# Patient Record
Sex: MALE | Race: WHITE | HISPANIC OR LATINO | Employment: UNEMPLOYED | URBAN - METROPOLITAN AREA
[De-identification: names, ages, dates, MRNs, and addresses within clinical notes are randomized per-mention and may not be internally consistent; named-entity substitution may affect disease eponyms.]

---

## 2020-02-09 ENCOUNTER — APPOINTMENT (EMERGENCY)
Dept: CT IMAGING | Facility: HOSPITAL | Age: 80
End: 2020-02-09
Payer: MEDICARE

## 2020-02-09 ENCOUNTER — HOSPITAL ENCOUNTER (EMERGENCY)
Facility: HOSPITAL | Age: 80
Discharge: HOME | End: 2020-02-09
Attending: EMERGENCY MEDICINE
Payer: MEDICARE

## 2020-02-09 VITALS
RESPIRATION RATE: 18 BRPM | HEIGHT: 71 IN | SYSTOLIC BLOOD PRESSURE: 185 MMHG | OXYGEN SATURATION: 98 % | HEART RATE: 78 BPM | WEIGHT: 205.47 LBS | TEMPERATURE: 98.1 F | BODY MASS INDEX: 28.77 KG/M2 | DIASTOLIC BLOOD PRESSURE: 96 MMHG

## 2020-02-09 DIAGNOSIS — R33.8 ACUTE URINARY RETENTION: Primary | ICD-10-CM

## 2020-02-09 LAB
BACTERIA UR QL AUTO: ABNORMAL /HPF
BILIRUB UR QL STRIP: NEGATIVE
CLARITY UR: CLEAR
COLOR UR: ABNORMAL
GLUCOSE UR STRIP-MCNC: NEGATIVE MG/DL
HGB UR QL STRIP.AUTO: 250
KETONES UR STRIP-MCNC: NEGATIVE MG/DL
LEUKOCYTE ESTERASE UR QL STRIP: NEGATIVE
NITRITE UR QL STRIP: NEGATIVE
NON-SQ EPI CELLS URNS QL MICRO: ABNORMAL /HPF
PH UR STRIP.AUTO: 6 [PH]
PROT UR STRIP-MCNC: NEGATIVE MG/DL
RBC #/AREA URNS AUTO: ABNORMAL /HPF
SP GR UR STRIP.AUTO: 1.01 (ref 1–1.04)
UROBILINOGEN UA: NEGATIVE MG/DL
WBC #/AREA URNS AUTO: ABNORMAL /HPF

## 2020-02-09 PROCEDURE — 74176 CT ABD & PELVIS W/O CONTRAST: CPT

## 2020-02-09 PROCEDURE — 99284 EMERGENCY DEPT VISIT MOD MDM: CPT

## 2020-02-09 PROCEDURE — 81001 URINALYSIS AUTO W/SCOPE: CPT | Performed by: EMERGENCY MEDICINE

## 2020-02-09 PROCEDURE — 99281 EMR DPT VST MAYX REQ PHY/QHP: CPT | Performed by: EMERGENCY MEDICINE

## 2020-02-09 NOTE — ED PROVIDER NOTES
History  Chief Complaint   Patient presents with    Urinary Retention     EMS stated that pt just came here from the DR yesterday  pt states that he has not urinated since early yesterday  pt states he is having a lot of abd/pelvic pain at this time  77 y/o male BBA for c/o difficulty urinating for one days duration; last urinated yesterday per patient  States just entered US yesterday from   No prior history of BPH or urinary retention  Denies any fever/chills; no prior history of nephrolithiasis  Denies any nausea, vomiting, and/or flank pain  No history of spinal trauma  None       Past Medical History:   Diagnosis Date    Kidney stones        Past Surgical History:   Procedure Laterality Date    LAPAROSCOPIC GASTRIC BYPASS         History reviewed  No pertinent family history  I have reviewed and agree with the history as documented  Social History     Tobacco Use    Smoking status: Never Smoker    Smokeless tobacco: Never Used   Substance Use Topics    Alcohol use: Yes    Drug use: Never        Review of Systems   Genitourinary: Positive for decreased urine volume and difficulty urinating  All other systems reviewed and are negative  Physical Exam  Physical Exam   Constitutional: He is oriented to person, place, and time  He appears well-developed and well-nourished  HENT:   Head: Normocephalic and atraumatic  Eyes: Pupils are equal, round, and reactive to light  Conjunctivae and EOM are normal    Neck: Normal range of motion  Neck supple  Cardiovascular: Normal rate  Pulmonary/Chest: Effort normal and breath sounds normal    Abdominal: Soft  Bowel sounds are normal    Genitourinary: Penis normal    Musculoskeletal: Normal range of motion  Neurological: He is alert and oriented to person, place, and time  Skin: Skin is warm  Capillary refill takes less than 2 seconds  Psychiatric: He has a normal mood and affect     Nursing note and vitals reviewed  Vital Signs  ED Triage Vitals [02/09/20 0525]   Temperature Pulse Respirations Blood Pressure SpO2   98 1 °F (36 7 °C) 78 18 (!) 185/96 98 %      Temp Source Heart Rate Source Patient Position - Orthostatic VS BP Location FiO2 (%)   Tympanic Monitor Standing Left arm --      Pain Score       --           Vitals:    02/09/20 0525   BP: (!) 185/96   Pulse: 78   Patient Position - Orthostatic VS: Standing         Visual Acuity      ED Medications  Medications - No data to display    Diagnostic Studies  Results Reviewed     Procedure Component Value Units Date/Time    Urine Microscopic [[id number]]  (Abnormal) Collected:  02/09/20 0547    Lab Status:  Final result Specimen:  Urine, Indwelling Dobbs Catheter Updated:  02/09/20 0631     RBC, UA 10-20 /hpf      WBC, UA None Seen /hpf      Epithelial Cells Occasional /hpf      Bacteria, UA Occasional /hpf     UA (URINE) with reflex to Scope [[id number]]  (Abnormal) Collected:  02/09/20 0547    Lab Status:  Final result Specimen:  Urine, Indwelling Dobbs Catheter Updated:  02/09/20 0600     Color, UA Straw     Clarity, UA Clear     Specific Gravity, UA 1 010     pH, UA 6 0     Leukocytes, UA Negative     Nitrite, UA Negative     Protein, UA Negative mg/dl      Glucose, UA Negative mg/dl      Ketones, UA Negative mg/dl      Bilirubin, UA Negative     Blood,  0     UROBILINOGEN UA Negative mg/dL                  CT renal stone study abdomen pelvis without contrast   Final Result by Tanner Wilson MD (02/09 0630)      No evidence of stone or hydronephrosis  Prostatomegaly  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                     Workstation performed: [id number]                    Procedures  Procedures         ED Course  ED Course as of Feb 09 0646   Saskia Gao Feb 09, 2020   6091 Bladder scan: 561 cc                                  MDM      Disposition  Final diagnoses:   Acute urinary retention     Time reflects when diagnosis was documented in both MDM as applicable and the Disposition within this note     Time User Action Codes Description Comment    2/9/2020  6:45 AM Evelene Martín [R33 9] Urinary retention     2/9/2020  6:45 AM Han Emily Add [R33 8] Acute urinary retention     2/9/2020  6:45 AM Han Emily Modify [R33 8] Acute urinary retention     2/9/2020  6:45 AM Stone Hush [R33 9] Urinary retention       ED Disposition     ED Disposition Condition Date/Time Comment    Discharge Stable Sun Feb 9, 2020  6:45 AM Roney Handy discharge to home/self care  Follow-up Information     Follow up With Specialties Details Why Contact Info    [facility] Call today  [address]  [phone]            Patient's Medications    No medications on file     No discharge procedures on file      ED Provider  Electronically Signed by           Thor Oakes DO  02/09/20 3745

## 2020-02-09 NOTE — ED NOTES
This RN placed a leg bag on patients gates catheter and given instructions to follow up with urology asap  Patient verbalized an understanding       Liam Leo RN  02/09/20 3608

## 2020-02-10 ENCOUNTER — HOSPITAL ENCOUNTER (EMERGENCY)
Facility: HOSPITAL | Age: 80
Discharge: HOME | End: 2020-02-10
Attending: EMERGENCY MEDICINE
Payer: MEDICARE

## 2020-02-10 ENCOUNTER — TELEPHONE (OUTPATIENT)
Dept: UROLOGY | Facility: MEDICAL CENTER | Age: 80
End: 2020-02-10

## 2020-02-10 VITALS
SYSTOLIC BLOOD PRESSURE: 168 MMHG | DIASTOLIC BLOOD PRESSURE: 88 MMHG | RESPIRATION RATE: 18 BRPM | HEART RATE: 80 BPM | OXYGEN SATURATION: 98 % | BODY MASS INDEX: 28.66 KG/M2 | TEMPERATURE: 99 F | WEIGHT: 205.47 LBS

## 2020-02-10 DIAGNOSIS — T83.9XXA PROBLEM WITH FOLEY CATHETER, INITIAL ENCOUNTER (HCC): Primary | ICD-10-CM

## 2020-02-10 PROCEDURE — 99281 EMR DPT VST MAYX REQ PHY/QHP: CPT | Performed by: PHYSICIAN ASSISTANT

## 2020-02-10 PROCEDURE — 99283 EMERGENCY DEPT VISIT LOW MDM: CPT

## 2020-02-10 NOTE — TELEPHONE ENCOUNTER
Complaint/diagnosis:  New pt er follow up urinary retention   Insurance:  medicare  History of Cancer:  no  Previous Urologist:  No  Outside testing/where:  no  what kind of test:  no  Records requested/where:  No  Preferred Location  [facility]

## 2020-02-10 NOTE — TELEPHONE ENCOUNTER
Spoke with the patient; Patient needs hospital follow-up for urinary retention  We've scheduled him for a new patient appointment on 02/12/2020 with Mike Rdz  Patient requested that I speak with his insurance in regards to getting transportation set up  I've agreed to help him and will call once the ride has been set up

## 2020-02-10 NOTE — TELEPHONE ENCOUNTER
Left message; I explained that he needs to call his insurance company and update his information in order for them to help him set up his transportation  Gave our office number to call back with any questions

## 2020-02-10 NOTE — TELEPHONE ENCOUNTER
This is a new patient that is being discharged from [facility]  Patient has urinary retention and has a catheter  Please call patient back at [phone]

## 2020-02-10 NOTE — ED PROVIDER NOTES
History  Chief Complaint   Patient presents with    Urinary Catheter Problem     75-year-old male with past medical history of kidney stone presenting for evaluation of Dobbs catheter issue  Patient was seen in this ED yesterday for urinary retention and had a Dobbs catheter placed  Patient was given information to follow up with Urology however he attempted to call the Urology number provided but it was incorrect  Instead of trying to call the ED he called EMS to bring him back to the hospital   He does state that he is having leaking at the meatus but states that is only a few drops  He reports urinary output through the tube into the bag  Denies any hematuria  None       Past Medical History:   Diagnosis Date    Kidney stones        Past Surgical History:   Procedure Laterality Date    LAPAROSCOPIC GASTRIC BYPASS         History reviewed  No pertinent family history  I have reviewed and agree with the history as documented  Social History     Tobacco Use    Smoking status: Never Smoker    Smokeless tobacco: Never Used   Substance Use Topics    Alcohol use: Yes    Drug use: Never        Review of Systems   All other systems reviewed and are negative  Physical Exam  Physical Exam   Constitutional: He is oriented to person, place, and time  He appears well-developed and well-nourished  No distress  HENT:   Head: Normocephalic and atraumatic  Right Ear: External ear normal    Left Ear: External ear normal    Eyes: Conjunctivae are normal    EOM grossly intact   Neck: Normal range of motion  Neck supple  No JVD present  Cardiovascular: Normal rate  Pulmonary/Chest: Effort normal  No stridor  No respiratory distress  He has no wheezes  Abdominal: Soft  He exhibits no distension  There is no tenderness  Musculoskeletal:   FROM, steady gait, cap refill brisk, strength and sensation grossly intact throughout   Lymphadenopathy:     He has no cervical adenopathy     Neurological: He is alert and oriented to person, place, and time  Skin: Skin is warm and dry  Capillary refill takes less than 2 seconds  Psychiatric: He has a normal mood and affect  His behavior is normal    Nursing note and vitals reviewed  Vital Signs  ED Triage Vitals [02/10/20 1251]   Temperature Pulse Respirations Blood Pressure SpO2   99 °F (37 2 °C) 80 18 168/88 98 %      Temp Source Heart Rate Source Patient Position - Orthostatic VS BP Location FiO2 (%)   Tympanic Monitor Lying Left arm --      Pain Score       7           Vitals:    02/10/20 1251   BP: 168/88   Pulse: 80   Patient Position - Orthostatic VS: Lying         Visual Acuity      ED Medications  Medications - No data to display    Diagnostic Studies  Results Reviewed     None                 No orders to display              Procedures  Procedures         ED Course                               MDM  Number of Diagnoses or Management Options  Problem with Gates catheter, initial encounter [facility]):   Diagnosis management comments: 70-year-old male presenting for evaluation of slight leaking surrounding his Gates catheter that was placed yesterday secondary to urinary retention, patient was given that information to follow up with Urology so he presented back to the ED, patient is in no pain currently, will provide him with gauze but no copious amt of urine leaking, no need to change gates at this time as it was placed <24 hours ago, advised to f/u with urology and pt given correct information    strict return to ED precautions discussed  Pt verbalizes understanding and agrees with plan  Pt is stable for discharge    Portions of the record may have been created with voice recognition software  Occasional wrong word or "sound a like" substitutions may have occurred due to the inherent limitations of voice recognition software  Read the chart carefully and recognize, using context, where substitutions have occurred            Disposition  Final diagnoses: Problem with Dobbs catheter, initial encounter [facility])     Time reflects when diagnosis was documented in both MDM as applicable and the Disposition within this note     Time User Action Codes Description Comment    2/10/2020  1:29 PM Peter Chaparro Add [T83  9XXA] Problem with Dobbs catheter, initial encounter [facility])       ED Disposition     ED Disposition Condition Date/Time Comment    Discharge Stable Mon Feb 10, 2020  1:29 PM Christie Roy discharge to home/self care  Follow-up Information     Follow up With Specialties Details Why Contact Info Additional [address] Family Medicine Call in 1 day  [address], [address] [facility] [phone]  [address], [address], [address] [place], [place], [address]    [facility] Call in 1 day  [place] [address]  [address]  [facility], [address], [facility] [place], [address]   [phone]          There are no discharge medications for this patient  No discharge procedures on file      ED Provider  Electronically Signed by           Serina Martínez PA-C  02/10/20 4895

## 2020-02-10 NOTE — TELEPHONE ENCOUNTER
Spoke with Rizwana Sood with Medicare transportation; As we reviewed the patient's information, Rizwana Sood pointed out that all their information on the patient is currently outdated - they don't have an updated phone number and they have his current address listed as being in [place]  He needs the patient to call them and update his information before they can schedule his ride  Will call the patient and let him know

## 2020-02-11 PROBLEM — R33.9 URINARY RETENTION: Status: ACTIVE | Noted: 2020-02-11

## 2020-02-12 ENCOUNTER — HOSPITAL ENCOUNTER (EMERGENCY)
Facility: HOSPITAL | Age: 80
Discharge: HOME | End: 2020-02-12
Attending: EMERGENCY MEDICINE
Payer: MEDICARE

## 2020-02-12 ENCOUNTER — TELEPHONE (OUTPATIENT)
Dept: UROLOGY | Facility: MEDICAL CENTER | Age: 80
End: 2020-02-12

## 2020-02-12 VITALS
HEART RATE: 75 BPM | TEMPERATURE: 96.8 F | WEIGHT: 201.94 LBS | OXYGEN SATURATION: 96 % | SYSTOLIC BLOOD PRESSURE: 168 MMHG | DIASTOLIC BLOOD PRESSURE: 84 MMHG | BODY MASS INDEX: 28.17 KG/M2 | RESPIRATION RATE: 18 BRPM

## 2020-02-12 DIAGNOSIS — N39.0 UTI (URINARY TRACT INFECTION) DUE TO URINARY INDWELLING FOLEY CATHETER (HCC): Primary | ICD-10-CM

## 2020-02-12 DIAGNOSIS — T83.511A UTI (URINARY TRACT INFECTION) DUE TO URINARY INDWELLING FOLEY CATHETER (HCC): Primary | ICD-10-CM

## 2020-02-12 LAB
BACTERIA UR QL AUTO: ABNORMAL /HPF
BILIRUB UR QL STRIP: NEGATIVE
CAOX CRY URNS QL MICRO: ABNORMAL /HPF
CLARITY UR: ABNORMAL
COLOR UR: ABNORMAL
GLUCOSE UR STRIP-MCNC: NEGATIVE MG/DL
HGB UR QL STRIP.AUTO: 250
HYALINE CASTS #/AREA URNS LPF: ABNORMAL /LPF
KETONES UR STRIP-MCNC: ABNORMAL MG/DL
LEUKOCYTE ESTERASE UR QL STRIP: 100
MUCOUS THREADS UR QL AUTO: ABNORMAL
NITRITE UR QL STRIP: POSITIVE
NON-SQ EPI CELLS URNS QL MICRO: ABNORMAL /HPF
PH UR STRIP.AUTO: 6 [PH]
PROT UR STRIP-MCNC: ABNORMAL MG/DL
RBC #/AREA URNS AUTO: ABNORMAL /HPF
SP GR UR STRIP.AUTO: 1.02 (ref 1–1.04)
UROBILINOGEN UA: NEGATIVE MG/DL
WBC #/AREA URNS AUTO: ABNORMAL /HPF

## 2020-02-12 PROCEDURE — 81001 URINALYSIS AUTO W/SCOPE: CPT | Performed by: PHYSICIAN ASSISTANT

## 2020-02-12 PROCEDURE — 81003 URINALYSIS AUTO W/O SCOPE: CPT | Performed by: PHYSICIAN ASSISTANT

## 2020-02-12 PROCEDURE — 87086 URINE CULTURE/COLONY COUNT: CPT | Performed by: PHYSICIAN ASSISTANT

## 2020-02-12 PROCEDURE — 99283 EMERGENCY DEPT VISIT LOW MDM: CPT

## 2020-02-12 PROCEDURE — 99284 EMERGENCY DEPT VISIT MOD MDM: CPT | Performed by: PHYSICIAN ASSISTANT

## 2020-02-12 RX ORDER — SULFAMETHOXAZOLE AND TRIMETHOPRIM 800; 160 MG/1; MG/1
1 TABLET ORAL 2 TIMES DAILY
Qty: 14 TABLET | Refills: 0 | Status: SHIPPED | OUTPATIENT
Start: 2020-02-12 | End: 2020-02-19

## 2020-02-12 NOTE — TELEPHONE ENCOUNTER
Patient called regarding transportation  I advised the patient that he needs to update his information with his insurance  Once he does yessenia advised we can give his an early morning new patient appointment with one of the AP

## 2020-02-12 NOTE — TELEPHONE ENCOUNTER
Spoke with the patient; I've called him to see if he got a hold of his insurance company and update his information as we had previously discussed  Patient reports that he hasn't, he's actually at the hospital in hopes that they can remove his Dobbs there  We'll leave this decision up to the doctor he's seeing today  He's free to call us if there's anything else we can do for him

## 2020-02-12 NOTE — TELEPHONE ENCOUNTER
Call Center spoke with pt who denies getting the message about calling his insurance company  He has not done this, and they are asking him to do it now  Call Center rescheduling appt to when he can get a ride to the office, unless he can get a ride today  Today's appt not rescheduled as of this time

## 2020-02-12 NOTE — TELEPHONE ENCOUNTER
Patient calling to find out about his transportation  I explained to patient we do not have transportation set up for him as he is a new patient  Patient stated his insurance set up  Advised patient he should call insurance as we have no knowledge of this  Advised patient if he has transportation issues to call back so we can reschedule for another time

## 2020-02-12 NOTE — TELEPHONE ENCOUNTER
Patient calling again  Looking for us to set up transportation for him to an appointment  He needs to reschedule his ER follow up appointment       He can be reached at [phone]

## 2020-02-12 NOTE — ED PROVIDER NOTES
History  Chief Complaint   Patient presents with    Urinary Catheter Problem     Patient is a 51-year-old male with a past medical history significant for kidney stones who presents today for evaluation of a Dobbs catheter issue and hematuria in the Dobbs bag  Patient was seen in this emergency department within the past week for urinary retention for which he had a Dobbs catheter placed and was recommended to follow up with Urology however reports he was unable to do so due to lack of transportation  Patient reports he has had hematuria noted in his Dobbs catheter bag beginning this morning and endorses some irritation at the urinary meatus however denies any penile discharge or erythema or swelling of the penis  Patient reports he is uncircumcised and has not had any difficulty retracting the foreskin  Patient denies any abdominal pain, fevers or chills associated with his symptoms  History provided by:  Patient   used: No    Urinary Catheter Problem   Location:  Urinary catheter  Associated symptoms: no abdominal pain, no chest pain, no congestion, no ear pain, no fatigue, no fever, no nausea, no rash, no rhinorrhea, no shortness of breath, no sore throat and no vomiting        None       Past Medical History:   Diagnosis Date    Kidney stones        Past Surgical History:   Procedure Laterality Date    LAPAROSCOPIC GASTRIC BYPASS         History reviewed  No pertinent family history  I have reviewed and agree with the history as documented  Social History     Tobacco Use    Smoking status: Never Smoker    Smokeless tobacco: Never Used   Substance Use Topics    Alcohol use: Yes    Drug use: Never       Review of Systems   Constitutional: Negative for chills, fatigue and fever  HENT: Negative for congestion, ear pain, rhinorrhea and sore throat  Eyes: Negative for redness  Respiratory: Negative for chest tightness and shortness of breath      Cardiovascular: Negative for chest pain and palpitations  Gastrointestinal: Negative for abdominal pain, nausea and vomiting  Genitourinary: Positive for hematuria  Negative for dysuria  Musculoskeletal: Negative  Skin: Negative for rash  Neurological: Negative for dizziness, syncope, light-headedness and numbness  Physical Exam  Physical Exam   Constitutional: He is oriented to person, place, and time  He appears well-developed and well-nourished  HENT:   Head: Normocephalic and atraumatic  Eyes: Pupils are equal, round, and reactive to light  Neck: Normal range of motion  Cardiovascular: Normal rate, regular rhythm and normal heart sounds  Pulmonary/Chest: Effort normal and breath sounds normal    Abdominal: Soft  There is no tenderness  There is no guarding  Genitourinary:         Lymphadenopathy:     He has no cervical adenopathy  Neurological: He is alert and oriented to person, place, and time  Skin: Skin is warm and dry  Capillary refill takes less than 2 seconds  Psychiatric: He has a normal mood and affect  Nursing note and vitals reviewed        Vital Signs  ED Triage Vitals [02/12/20 1339]   Temperature Pulse Respirations Blood Pressure SpO2   (!) 96 8 °F (36 °C) 75 18 168/84 96 %      Temp Source Heart Rate Source Patient Position - Orthostatic VS BP Location FiO2 (%)   Tympanic Monitor Sitting Left arm --      Pain Score       8           Vitals:    02/12/20 1339   BP: 168/84   Pulse: 75   Patient Position - Orthostatic VS: Sitting         Visual Acuity      ED Medications  Medications - No data to display    Diagnostic Studies  Results Reviewed     Procedure Component Value Units Date/Time    Urine Microscopic [[id number]]  (Abnormal) Collected:  02/12/20 1400    Lab Status:  Final result Specimen:  Urine, Indwelling Dobbs Catheter Updated:  02/12/20 1504     RBC, UA 30-50 /hpf      WBC, UA 30-50 /hpf      Epithelial Cells Occasional /hpf      Bacteria, UA Innumerable /hpf Hyaline Casts, UA 0-1 /lpf      Ca Oxalate Indigo, UA Occasional /hpf      MUCUS THREADS Innumerable    Urine culture [[id number]] Collected:  02/12/20 1400    Lab Status: In process Specimen:  Urine, Indwelling Dobbs Catheter Updated:  02/12/20 1504    UA w Reflex to Microscopic w Reflex to Culture [[id number]]  (Abnormal) Collected:  02/12/20 1400    Lab Status:  Final result Specimen:  Urine, Indwelling Dobbs Catheter Updated:  02/12/20 1455     Color, UA Kaya     Clarity, UA Cloudy     Specific [place], UA 1 020     pH, UA 6 0     Leukocytes,  0     Nitrite, UA Positive     Protein,  (2+) mg/dl      Glucose, UA Negative mg/dl      Ketones, UA 5 (Trace) mg/dl      Bilirubin, UA Negative     Blood,  0     UROBILINOGEN UA Negative mg/dL                  No orders to display              Procedures  Procedures         ED Course                               MDM      Disposition  Final diagnoses:   UTI (urinary tract infection) due to urinary indwelling Dobbs catheter ([address] )     Time reflects when diagnosis was documented in both MDM as applicable and the Disposition within this note     Time User Action Codes Description Comment    2/12/2020  3:02 PM Peter Monique Add [T83 511A,  N39 0] UTI (urinary tract infection) due to urinary indwelling Dobbs catheter [facility])       ED Disposition     ED Disposition Condition Date/Time Comment    Discharge Good Wed Feb 12, 2020  2:59 PM Anaid Roane discharge to home/self care              Follow-up Information     Follow up With Specialties Details Why Contact Info    Tami Ramirez MD Urology Schedule an appointment as soon as possible for a visit in 1 day  [address] [phone]            Discharge Medication List as of 2/12/2020  3:02 PM      START taking these medications    Details   sulfamethoxazole-trimethoprim (BACTRIM DS) 800-160 mg per tablet Take 1 tablet by mouth 2 (two) times a day for 7 days smx-tmp DS (BACTRIM) 800-160 mg tabs (1tab q12 D10), Starting Wed 2/12/2020, Until Wed 2/19/2020, Print           No discharge procedures on file      PDMP Review     None          ED Provider  Electronically Signed by           Claudia Verdugo PA-C  02/12/20 8367

## 2020-02-13 LAB — BACTERIA UR CULT: NORMAL

## 2020-02-13 NOTE — TELEPHONE ENCOUNTER
Please Triage - ER FU    Patient calling to schedule an ER visit  Se[facility] [address] [facility] on 2/12 for UTI  Dilia was not removed at ER     Willing to be seen at [facility] or [place]      He can be reached at [phone]

## 2020-02-14 ENCOUNTER — OFFICE VISIT (OUTPATIENT)
Dept: UROLOGY | Facility: MEDICAL CENTER | Age: 80
End: 2020-02-14
Payer: MEDICARE

## 2020-02-14 VITALS
DIASTOLIC BLOOD PRESSURE: 68 MMHG | WEIGHT: 201 LBS | SYSTOLIC BLOOD PRESSURE: 110 MMHG | BODY MASS INDEX: 28.14 KG/M2 | HEART RATE: 97 BPM | HEIGHT: 71 IN

## 2020-02-14 DIAGNOSIS — N30.00 ACUTE CYSTITIS WITHOUT HEMATURIA: ICD-10-CM

## 2020-02-14 DIAGNOSIS — R33.9 URINARY RETENTION: ICD-10-CM

## 2020-02-14 DIAGNOSIS — N40.1 BPH WITH URINARY OBSTRUCTION: Primary | ICD-10-CM

## 2020-02-14 DIAGNOSIS — N13.8 BPH WITH URINARY OBSTRUCTION: Primary | ICD-10-CM

## 2020-02-14 PROCEDURE — 99204 OFFICE O/P NEW MOD 45 MIN: CPT | Performed by: UROLOGY

## 2020-02-14 RX ORDER — TAMSULOSIN HYDROCHLORIDE 0.4 MG/1
0.4 CAPSULE ORAL DAILY
Qty: 30 CAPSULE | Refills: 3 | Status: SHIPPED | OUTPATIENT
Start: 2020-02-14

## 2020-02-14 NOTE — PROGRESS NOTES
Assessment/Plan:      Diagnoses and all orders for this visit:    BPH with urinary obstruction  -     tamsulosin (FLOMAX) 0 4 mg; Take 1 capsule (0 4 mg total) by mouth daily    Urinary retention  -     POCT Measure PVR; Future    Acute cystitis without hematuria        Plan:  He will complete the antibiotic course and will give him a voiding trial and start him on alpha blockers  He will follow up in a few weeks for reassessment and PVR  Subjective:  Urinary retention     Patient ID: Isabel Olvera is a 78 y o  male  HPI  Earlier this week the patient, the patient experienced symptoms of a UTI and difficulty voiding  He was found to be in urinary retention upon a visit to the emergency room, and a Dobbs catheter was placed in left in place  He was started and is continuing on antibiotics (Bactrim)  He denies any prior history of UTIs or voiding dysfunction  He was told he had an enlarged prostate and may have had some symptoms occasionally related to that  Review of Systems   Constitutional: Negative  HENT: Negative  Eyes: Negative  Respiratory: Negative  Cardiovascular: Negative  Gastrointestinal: Positive for abdominal pain  Endocrine: Negative  Genitourinary: Positive for difficulty urinating and dysuria  Musculoskeletal: Negative  Skin: Negative  Allergic/Immunologic: Negative  Neurological: Negative  Hematological: Negative  Psychiatric/Behavioral: Negative  Objective:     Physical Exam   Constitutional: He is oriented to person, place, and time  He appears well-developed and well-nourished  No distress  HENT:   Head: Normocephalic and atraumatic  Nose: Nose normal    Mouth/Throat: Oropharynx is clear and moist    Eyes: Pupils are equal, round, and reactive to light  Conjunctivae and EOM are normal  No scleral icterus  Neck: Normal range of motion  Neck supple     Cardiovascular: Normal rate, regular rhythm, normal heart sounds and intact distal pulses  No murmur heard  Pulmonary/Chest: Effort normal and breath sounds normal  No respiratory distress  He has no wheezes  He has no rales  Abdominal: Soft  Bowel sounds are normal  He exhibits no distension and no mass  There is no tenderness  Genitourinary:   Genitourinary Comments: Dobbs catheter in place with juliocesar urine returned  Prostate exam:  3/4 enlargement, smooth, with no nodules induration   Musculoskeletal: Normal range of motion  He exhibits no edema or tenderness  Lymphadenopathy:     He has no cervical adenopathy  Neurological: He is alert and oriented to person, place, and time  No cranial nerve deficit  Skin: Skin is warm and dry  No rash noted  No erythema  No pallor  Psychiatric: He has a normal mood and affect  His behavior is normal  Judgment and thought content normal    Nursing note and vitals reviewed  Dobbs catheter removed today    CT scan the abdomen and pelvis from 02/09/2020  IMPRESSION:  No evidence of stone or hydronephrosis  Prostatomegaly  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

## 2025-03-04 ENCOUNTER — OFFICE VISIT (OUTPATIENT)
Dept: FAMILY MEDICINE CLINIC | Facility: CLINIC | Age: 85
End: 2025-03-04

## 2025-03-04 VITALS
WEIGHT: 204.1 LBS | DIASTOLIC BLOOD PRESSURE: 84 MMHG | SYSTOLIC BLOOD PRESSURE: 168 MMHG | HEART RATE: 71 BPM | TEMPERATURE: 98.1 F | RESPIRATION RATE: 18 BRPM | OXYGEN SATURATION: 99 % | HEIGHT: 67 IN | BODY MASS INDEX: 32.03 KG/M2

## 2025-03-04 DIAGNOSIS — Z23 ENCOUNTER FOR IMMUNIZATION: ICD-10-CM

## 2025-03-04 DIAGNOSIS — R39.12 BENIGN PROSTATIC HYPERPLASIA WITH WEAK URINARY STREAM: ICD-10-CM

## 2025-03-04 DIAGNOSIS — R53.83 OTHER FATIGUE: ICD-10-CM

## 2025-03-04 DIAGNOSIS — R41.3 IMPAIRED MEMORY: ICD-10-CM

## 2025-03-04 DIAGNOSIS — J44.89 COPD WITH CHRONIC BRONCHITIS (HCC): Primary | ICD-10-CM

## 2025-03-04 DIAGNOSIS — R03.0 ELEVATED BLOOD PRESSURE READING WITHOUT DIAGNOSIS OF HYPERTENSION: ICD-10-CM

## 2025-03-04 DIAGNOSIS — R29.810 FACIAL DROOP: ICD-10-CM

## 2025-03-04 DIAGNOSIS — Z13.220 SCREENING FOR LIPID DISORDERS: ICD-10-CM

## 2025-03-04 DIAGNOSIS — N40.1 BENIGN PROSTATIC HYPERPLASIA WITH WEAK URINARY STREAM: ICD-10-CM

## 2025-03-04 PROBLEM — J43.8 OTHER EMPHYSEMA (HCC): Status: ACTIVE | Noted: 2025-03-04

## 2025-03-04 PROBLEM — N13.8 BPH WITH URINARY OBSTRUCTION: Status: ACTIVE | Noted: 2020-04-20

## 2025-03-04 PROBLEM — R33.9 URINARY RETENTION: Status: RESOLVED | Noted: 2020-02-11 | Resolved: 2025-03-04

## 2025-03-04 PROCEDURE — G0008 ADMIN INFLUENZA VIRUS VAC: HCPCS | Performed by: FAMILY MEDICINE

## 2025-03-04 PROCEDURE — G0009 ADMIN PNEUMOCOCCAL VACCINE: HCPCS | Performed by: FAMILY MEDICINE

## 2025-03-04 PROCEDURE — 99204 OFFICE O/P NEW MOD 45 MIN: CPT | Performed by: FAMILY MEDICINE

## 2025-03-04 PROCEDURE — 90677 PCV20 VACCINE IM: CPT | Performed by: FAMILY MEDICINE

## 2025-03-04 PROCEDURE — 90662 IIV NO PRSV INCREASED AG IM: CPT | Performed by: FAMILY MEDICINE

## 2025-03-04 RX ORDER — ALBUTEROL SULFATE 90 UG/1
2 INHALANT RESPIRATORY (INHALATION) EVERY 6 HOURS PRN
Qty: 18 G | Refills: 5 | Status: SHIPPED | OUTPATIENT
Start: 2025-03-04

## 2025-03-04 RX ORDER — FLUTICASONE PROPIONATE AND SALMETEROL 100; 50 UG/1; UG/1
1 POWDER RESPIRATORY (INHALATION) 2 TIMES DAILY
Qty: 60 BLISTER | Refills: 1 | Status: SHIPPED | OUTPATIENT
Start: 2025-03-04

## 2025-03-04 RX ORDER — TAMSULOSIN HYDROCHLORIDE 0.4 MG/1
0.4 CAPSULE ORAL DAILY
Qty: 30 CAPSULE | Refills: 3 | Status: SHIPPED | OUTPATIENT
Start: 2025-03-04

## 2025-03-04 NOTE — ASSESSMENT & PLAN NOTE
Chronic left-sided facial droop  Hx of mastoiditis requiring surgical intervention.  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

## 2025-03-04 NOTE — PROGRESS NOTES
Name: Omar Ace      : 1940      MRN: [id number]  Encounter Provider: Nicholas Austin MD  Encounter Date: 3/4/2025   Encounter department: [facility]    Assessment & Plan  COPD with chronic bronchitis (HCC)  Not in acute exacerbation  Provided prescription refills for Advair and albuterol  He is up-to-date with pneumococcal, influenza, and COVID vaccination  Orders:    Fluticasone-Salmeterol (Advair Diskus) 100-50 mcg/dose inhaler; Inhale 1 puff 2 (two) times a day Rinse mouth after use.    albuterol (Ventolin HFA) 90 mcg/act inhaler; Inhale 2 puffs every 6 (six) hours as needed for wheezing    Benign prostatic hyperplasia with weak urinary stream  Patient reports obstructive urinary symptoms including postvoid dribbling and weak stream  History of BPH which was well-controlled with Flomax  Initiate Flomax    Orders:    tamsulosin (FLOMAX) 0.4 mg; Take 1 capsule (0.4 mg total) by mouth daily    Elevated blood pressure reading without diagnosis of hypertension  Blood pressure elevated today  Recommend DASH diet  Recheck blood pressure next visit       Facial droop  Chronic left-sided facial droop  Hx of mastoiditis requiring surgical intervention.  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         Other fatigue    Orders:    TSH, 3rd generation; Future    Vitamin D 25 hydroxy; Future    Impaired memory  Impaired short-term memory loss  Requires assistance at home from caregiver with IADL  Referral to Senior care  Orders:    Ambulatory Referral to Senior Care; Future    CBC and differential; Future    Comprehensive metabolic panel; Future    TSH, 3rd generation; Future    Vitamin B12; Future    Vitamin D 25 hydroxy; Future    Screening for lipid disorders    Orders:    Lipid panel; Future    Encounter for immunization    Orders:    Pneumococcal Conjugate Vaccine 20-valent (Pcv20)    influenza vaccine, high-dose, PF 0.5 mL  (Fluzone High Dose)         History of Present Illness        ID [id number]    84-year-old male with a history of BPH and COPD who presents today to \[facility]\"" care.  He is here today with his caregiver.  He is from [facility] and recently moved to [place].  He has no family in the area.  He has a caregiver that helps him at home.  He reports that he has been experiencing some memory loss for the past few months.  He reports that this happens intermittently.  He reports frequently forgetting to complete task that he has planned for today.  He sometimes forgets to take medications.  He requires help with IADLs from his caregiver.  He was a former [employer].  He reports being diagnosed with COPD many years ago.  He sometimes requires an inhaler to help him when he is wheezing.  He needs prescription refills for his inhalers.  He also has a history of BPH.  He reports frequent weak stream and postvoid dribbling.  He used to take Flomax a long time ago which seems to help with his urinary symptoms.        Review of Systems   Constitutional:  Positive for fatigue. Negative for activity change, appetite change, chills, diaphoresis and fever.   HENT:  Negative for congestion and sore throat.    Eyes:  Negative for visual disturbance.   Respiratory:  Negative for cough, shortness of breath and wheezing.    Cardiovascular:  Negative for chest pain and palpitations.   Gastrointestinal:  Negative for abdominal pain, diarrhea and vomiting.   Genitourinary:  Positive for difficulty urinating. Negative for dysuria, hematuria and urgency.   Musculoskeletal:  Positive for back pain.   Skin:  Negative for rash.   Allergic/Immunologic: Negative for food allergies.   Neurological:  Negative for dizziness, speech difficulty, weakness, light-headedness and headaches.   Hematological:  Negative for adenopathy.   Psychiatric/Behavioral:  Negative for behavioral problems, dysphoric mood and sleep disturbance.      Past  "Medical History:   Diagnosis Date    Kidney stones      Past Surgical History:   Procedure Laterality Date    LAPAROSCOPIC GASTRIC BYPASS       History reviewed. No pertinent family history.  Social History     Tobacco Use    Smoking status: Never    Smokeless tobacco: Never   Vaping Use    Vaping status: Never Used   Substance and Sexual Activity    Alcohol use: Yes    Drug use: Never    Sexual activity: Not on file     Current Outpatient Medications on File Prior to Visit   Medication Sig    tamsulosin (FLOMAX) 0.4 mg Take 1 capsule (0.4 mg total) by mouth daily (Patient not taking: Reported on 3/4/2025)     No Known Allergies  Immunization History   Administered Date(s) Administered    COVID-19 MODERNA VACC 0.5 ML IM 12/08/2021    COVID-19 PFIZER VACCINE 0.3 ML IM 05/21/2021, 06/10/2021     Objective   /84 (BP Location: Right arm, Patient Position: Sitting, Cuff Size: Large) Comment: does not take any bp meds  Pulse 71   Temp 98.1 °F (36.7 °C) (Temporal)   Resp 18   Ht 5' 7\" (1.702 m)   Wt 92.6 kg (204 lb 1.6 oz)   SpO2 99%   BMI 31.97 kg/m²     Physical Exam  Vitals reviewed.   Constitutional:       General: He is not in acute distress.     Appearance: Normal appearance. He is well-developed. He is not ill-appearing, toxic-appearing or diaphoretic.   HENT:      Head: Normocephalic and atraumatic.      Right Ear: External ear normal.      Left Ear: External ear normal.      Nose: Nose normal.      Mouth/Throat:      Mouth: Mucous membranes are moist.      Pharynx: No oropharyngeal exudate or posterior oropharyngeal erythema.   Eyes:      General: No scleral icterus.        Right eye: No discharge.         Left eye: No discharge.      Extraocular Movements: Extraocular movements intact.      Conjunctiva/sclera: Conjunctivae normal.   Cardiovascular:      Rate and Rhythm: Normal rate and regular rhythm.      Heart sounds: Normal heart sounds. No murmur heard.     No friction rub. No gallop. "   Pulmonary:      Effort: Pulmonary effort is normal. No respiratory distress.      Breath sounds: Normal breath sounds. No stridor. No wheezing or rhonchi.   Abdominal:      General: Bowel sounds are normal. There is no distension.      Palpations: Abdomen is soft. There is no mass.      Tenderness: There is no abdominal tenderness. There is no guarding.   Musculoskeletal:         General: Normal range of motion.      Cervical back: Normal range of motion.      Right lower leg: No edema.      Left lower leg: No edema.   Skin:     General: Skin is warm.      Capillary Refill: Capillary refill takes less than 2 seconds.   Neurological:      General: No focal deficit present.      Mental Status: He is alert.      Gait: Gait normal.      Comments: Alert and oriented to person and place.  Not oriented to time.    Left-sided facial droop at baseline   Psychiatric:         Mood and Affect: Mood normal.         Behavior: Behavior normal.

## 2025-03-04 NOTE — ASSESSMENT & PLAN NOTE
Not in acute exacerbation  Provided prescription refills for Advair and albuterol  He is up-to-date with pneumococcal, influenza, and COVID vaccination  Orders:    Fluticasone-Salmeterol (Advair Diskus) 100-50 mcg/dose inhaler; Inhale 1 puff 2 (two) times a day Rinse mouth after use.    albuterol (Ventolin HFA) 90 mcg/act inhaler; Inhale 2 puffs every 6 (six) hours as needed for wheezing

## 2025-03-04 NOTE — ASSESSMENT & PLAN NOTE
Impaired short-term memory loss  Requires assistance at home from caregiver with IADL  Referral to Senior care  Orders:    Ambulatory Referral to Senior Care; Future    CBC and differential; Future    Comprehensive metabolic panel; Future    TSH, 3rd generation; Future    Vitamin B12; Future    Vitamin D 25 hydroxy; Future

## 2025-03-04 NOTE — ASSESSMENT & PLAN NOTE
Patient reports obstructive urinary symptoms including postvoid dribbling and weak stream  History of BPH which was well-controlled with Flomax  Initiate Flomax    Orders:    tamsulosin (FLOMAX) 0.4 mg; Take 1 capsule (0.4 mg total) by mouth daily

## 2025-03-17 ENCOUNTER — TELEPHONE (OUTPATIENT)
Dept: FAMILY MEDICINE CLINIC | Facility: CLINIC | Age: 85
End: 2025-03-17

## 2025-03-17 DIAGNOSIS — N40.1 BENIGN PROSTATIC HYPERPLASIA WITH WEAK URINARY STREAM: ICD-10-CM

## 2025-03-17 DIAGNOSIS — J44.89 COPD WITH CHRONIC BRONCHITIS (HCC): ICD-10-CM

## 2025-03-17 DIAGNOSIS — R39.12 BENIGN PROSTATIC HYPERPLASIA WITH WEAK URINARY STREAM: ICD-10-CM

## 2025-03-17 NOTE — TELEPHONE ENCOUNTER
Patient call requesting to resend his [facility] 03/04/25    [place] [facility]

## 2025-03-18 RX ORDER — TAMSULOSIN HYDROCHLORIDE 0.4 MG/1
0.4 CAPSULE ORAL DAILY
Qty: 30 CAPSULE | Refills: 3 | Status: SHIPPED | OUTPATIENT
Start: 2025-03-18

## 2025-03-18 RX ORDER — ALBUTEROL SULFATE 90 UG/1
2 INHALANT RESPIRATORY (INHALATION) EVERY 6 HOURS PRN
Qty: 18 G | Refills: 5 | Status: SHIPPED | OUTPATIENT
Start: 2025-03-18

## 2025-03-18 RX ORDER — FLUTICASONE PROPIONATE AND SALMETEROL 100; 50 UG/1; UG/1
1 POWDER RESPIRATORY (INHALATION) 2 TIMES DAILY
Qty: 60 BLISTER | Refills: 1 | Status: SHIPPED | OUTPATIENT
Start: 2025-03-18

## 2025-03-26 ENCOUNTER — TELEPHONE (OUTPATIENT)
Dept: FAMILY MEDICINE CLINIC | Facility: CLINIC | Age: 85
End: 2025-03-26

## 2025-03-26 NOTE — TELEPHONE ENCOUNTER
IEB FORM RECEIVED ON 3/26/2025  GIVEN TO YAZ LEVY TO BE COMPLETED, SIGNED BY MD/ (INCLUDE LISC. NUMBER), AND FAXED BACK IN 3 DAYS

## 2025-03-26 NOTE — TELEPHONE ENCOUNTER
FORM COMPLETED, SIGNED BY MD/ (INCLUDE LISC. NUMBER) FAXED ON 03/26/25 TO [facility] at [phone]. FAX CONFIRMATION RECEIVED. FORM GIVEN TO COURTNEY TO SCAN

## 2025-03-31 ENCOUNTER — CONSULT (OUTPATIENT)
Age: 85
End: 2025-03-31
Payer: MEDICARE

## 2025-03-31 VITALS
OXYGEN SATURATION: 97 % | TEMPERATURE: 98.2 F | SYSTOLIC BLOOD PRESSURE: 110 MMHG | DIASTOLIC BLOOD PRESSURE: 60 MMHG | BODY MASS INDEX: 30.61 KG/M2 | HEART RATE: 71 BPM | HEIGHT: 67 IN | WEIGHT: 195 LBS

## 2025-03-31 DIAGNOSIS — J44.89 COPD WITH CHRONIC BRONCHITIS (HCC): ICD-10-CM

## 2025-03-31 DIAGNOSIS — Z71.89 ACP (ADVANCE CARE PLANNING): ICD-10-CM

## 2025-03-31 DIAGNOSIS — N13.8 BPH WITH URINARY OBSTRUCTION: ICD-10-CM

## 2025-03-31 DIAGNOSIS — R29.810 FACIAL DROOP: ICD-10-CM

## 2025-03-31 DIAGNOSIS — R41.89 COGNITIVE IMPAIRMENT: Primary | ICD-10-CM

## 2025-03-31 DIAGNOSIS — N18.31 STAGE 3A CHRONIC KIDNEY DISEASE (HCC): ICD-10-CM

## 2025-03-31 DIAGNOSIS — N40.1 BPH WITH URINARY OBSTRUCTION: ICD-10-CM

## 2025-03-31 DIAGNOSIS — R26.2 AMBULATORY DYSFUNCTION: ICD-10-CM

## 2025-03-31 DIAGNOSIS — R41.3 IMPAIRED MEMORY: ICD-10-CM

## 2025-03-31 DIAGNOSIS — R54 FRAILTY: ICD-10-CM

## 2025-03-31 PROCEDURE — 99205 OFFICE O/P NEW HI 60 MIN: CPT | Performed by: INTERNAL MEDICINE

## 2025-03-31 NOTE — PROGRESS NOTES
[facility]  Consultation  [address], [address]  [place] [address] [phone]      NAME: Omar Ace  AGE: 85 y.o. SEX: male    DATE OF ENCOUNTER: 3/31/2025    Assessment and Plan     1. 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 He lives with a friend and his niece will visit to assist in his care  - Patient does not have a living will or POA.  -Care conference in 4-6 weeks     2. Impaired memory  - See above  - Ambulatory Referral to Senior Care  - CT head wo contrast; Future    3. Frailty  - Maintain adequate nutrition  - Advised to maintain physical activity as able  - Continue to use walker/cane as needed    4. Ambulatory dysfunction  - TUG 23 seconds  -Uses a walker and cane for ambulation  -Fall risk  -Fall precautions  -Maintain physical activity as able  -Physical therapy to increase muscle strength and gait stability  -PT/OT referral next visit    5. 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 COPD with chronic bronchitis (HCC)  - Currently on fluticasone-salmeterol twice daily and albuterol as needed for wheezing  - Continue to follow with PCP    7. BPH with urinary obstruction  - Currently on Flomax daily  - Continue to follow with PCP    8.  CKD stage IIIa  -GFR 52  -Maintain adequate hydration  -Avoid nephrotoxic agents    9. ACP (advance care planning)  - Patient does not have a living  will or POA    Chief Complaint     Patient is here for a geriatric assessment with memory issues as a concern     History of Present Illness     We had the pleasure of evaluating Omar Ace who is a 85 y.o. male in Geriatric consultation today. Memory issues were first noticed byfamily. Per his neiece, Toni, his memory issues have been present for the past 4-5 years, and include short term memory loss. Symptoms mainly affect short term memory loss, and have worsened.     Omar Ace reports that he does not drive, does  get lost in familiar settings, and has not had recent citations or accidents. His niece, Toni, reports that Omar does manage his own affairs such as balancing his checkbook, paying bills, and managing investments and does not have any difficulties with handling these financial affairs. Omar does not notice changes in his own mood or personality and has not been treated in the past for depression. Omar does not note any hearing or vision issues and does report sleep issues due to difficulty falling asleep due to anxiety. They do not have a living will and do not have an appointed POA.     Family members accompanying the patient today include (Toni, niece). They report the patients behaviors include the following: Family Notes Behaviors wandering.    FUNCTIONAL STATUS:  ADLs  Does patient require assistance with:  Bathing: Sometimes  Dressing: Yes due to shoulder pain  Transferring: Sometimes due to hip and/or knee pain  Continence: Yes, urinary incontinence and has trouble getting to the bathroom in time  Toileting: No  Feeding: No     IADLs  Dose patient require assistance with:  Telephone: No  Shopping: Yes because he gets lost  Food Preparation: Yes  Housekeeping: Yes  Laundry: Yes  Transportation: Yes  Medications: Yes  Finances: No     NEUROPSYCH SYMPTOMS:  Does patient get angry or hostile? Yes. Resist care from others? No.  Does patient see or hear things that no one  else can see or hear? Yes, patient has reported people knocking on his door in the middle of the night  Does patient act impatient and cranky? Yes. Does mood frequently change for no apparent reason? No  Does patient act suspicious without good reason (example: believes that others are stealing from him or her, or planning to harm him or her in some way)? Yes, occasionally believes people are trying to get into his home and asks to make sure doors are locked  Does patient less interested in his or her usual activities or in the activities and plans of others? No  Does patient have trouble sleeping at night? Yes, occasionally wakes up early and cannot fall back to sleep  Dose patient have abnormal movements while asleep? No     SAFETY:  Hearing and vision issue: Yes, wears glasses  Any gait or balance disorder: Yes  Uses: Walker at home, cane when outside of home  Any falls in the last year: No  Any history of wandering: Yes  Are there firearms or guns in the home: No  Does patient drive: No  Any driving accidents or citations in the last year: No  Any concerns about patient's ability to drive: No, does not drive     ACP REVIEW:  Does patient have POA: No  Does patient have a Living will: No  Any legal assistance needed for healthcare planning?:       The following portions of the patient's history were reviewed and updated as appropriate: allergies, current medications, past family history, past medical history, past social history, past surgical history and problem list.    Review of Systems     Review of Systems   Constitutional:  Negative for chills and fever.   Eyes:  Negative for pain and visual disturbance.   Respiratory:  Negative for chest tightness and shortness of breath.    Cardiovascular:  Negative for chest pain and palpitations.   Gastrointestinal:  Negative for nausea and vomiting.   Genitourinary:  Positive for urgency.   Musculoskeletal:  Positive for back pain and gait problem.   Neurological:   "Positive for facial asymmetry (chronic). Negative for numbness.   Psychiatric/Behavioral:  Positive for hallucinations (auditory). Negative for behavioral problems.        Objective     /60 (BP Location: Left arm, Patient Position: Sitting, Cuff Size: Standard)   Pulse 71   Temp 98.2 °F (36.8 °C) (Temporal)   Ht 5' 7\" (1.702 m)   Wt 88.5 kg (195 lb)   SpO2 97%   BMI 30.54 kg/m²     Physical Exam  Constitutional:       General: He is not in acute distress.     Appearance: Normal appearance. He is not ill-appearing or toxic-appearing.   HENT:      Head: Normocephalic.      Nose: Nose normal.      Mouth/Throat:      Mouth: Mucous membranes are moist.   Eyes:      Extraocular Movements: Extraocular movements intact.      Pupils: Pupils are equal, round, and reactive to light.   Cardiovascular:      Rate and Rhythm: Normal rate and regular rhythm.      Heart sounds: No murmur heard.  Pulmonary:      Effort: Pulmonary effort is normal. No respiratory distress.      Breath sounds: Normal breath sounds. No wheezing, rhonchi or rales.   Abdominal:      General: Bowel sounds are normal.      Palpations: Abdomen is soft.      Tenderness: There is no abdominal tenderness.   Musculoskeletal:         General: No swelling or tenderness.   Skin:     General: Skin is warm and dry.   Neurological:      General: No focal deficit present.      Mental Status: He is disoriented.      Cranial Nerves: Facial asymmetry (chronic) present. No dysarthria.      Motor: Weakness (4+/5 R hip flexion) present.      Gait: Gait abnormal (Slow).      Comments:   Oriented to self and place  Disoriented to current president, day of the week, date, month, and year  MoCA 11/30  GDS 4  TUG 23         Pertinent Laboratory/Diagnostic Studies:  I have personally reviewed lab results from 3/15/2025 including  CBC: WBC 5.6, Hb 12.2, HCT 36.6 and platelets 170  CMP: , K4.6, BUN 20, creatinine 1.33 and GFR 52    "